# Patient Record
Sex: FEMALE | Race: WHITE | HISPANIC OR LATINO | Employment: UNEMPLOYED | URBAN - METROPOLITAN AREA
[De-identification: names, ages, dates, MRNs, and addresses within clinical notes are randomized per-mention and may not be internally consistent; named-entity substitution may affect disease eponyms.]

---

## 2022-05-27 PROCEDURE — 96372 THER/PROPH/DIAG INJ SC/IM: CPT

## 2022-05-27 PROCEDURE — 99285 EMERGENCY DEPT VISIT HI MDM: CPT | Mod: 25

## 2022-05-27 RX ORDER — DIPHENHYDRAMINE HCL 25 MG
50 CAPSULE ORAL ONCE
Status: DISCONTINUED | OUTPATIENT
Start: 2022-05-27 | End: 2022-05-28 | Stop reason: HOSPADM

## 2022-05-27 RX ORDER — ONDANSETRON 4 MG/1
4 TABLET, ORALLY DISINTEGRATING ORAL ONCE
Status: DISCONTINUED | OUTPATIENT
Start: 2022-05-27 | End: 2022-05-28 | Stop reason: HOSPADM

## 2022-05-28 ENCOUNTER — APPOINTMENT (OUTPATIENT)
Dept: GENERAL RADIOLOGY | Facility: CLINIC | Age: 72
End: 2022-05-28
Attending: EMERGENCY MEDICINE

## 2022-05-28 ENCOUNTER — HOSPITAL ENCOUNTER (EMERGENCY)
Facility: CLINIC | Age: 72
Discharge: HOME | End: 2022-05-28
Attending: EMERGENCY MEDICINE | Admitting: EMERGENCY MEDICINE

## 2022-05-28 VITALS
HEART RATE: 102 BPM | DIASTOLIC BLOOD PRESSURE: 74 MMHG | SYSTOLIC BLOOD PRESSURE: 124 MMHG | RESPIRATION RATE: 16 BRPM | OXYGEN SATURATION: 91 %

## 2022-05-28 DIAGNOSIS — T78.2XXA ANAPHYLAXIS, INITIAL ENCOUNTER: ICD-10-CM

## 2022-05-28 PROCEDURE — 250N000009 HC RX 250: Performed by: EMERGENCY MEDICINE

## 2022-05-28 PROCEDURE — 71046 X-RAY EXAM CHEST 2 VIEWS: CPT

## 2022-05-28 PROCEDURE — 250N000011 HC RX IP 250 OP 636: Performed by: EMERGENCY MEDICINE

## 2022-05-28 PROCEDURE — 96374 THER/PROPH/DIAG INJ IV PUSH: CPT

## 2022-05-28 PROCEDURE — 96375 TX/PRO/DX INJ NEW DRUG ADDON: CPT

## 2022-05-28 RX ORDER — PREDNISONE 20 MG/1
40 TABLET ORAL DAILY
Qty: 4 TABLET | Refills: 0 | Status: SHIPPED | OUTPATIENT
Start: 2022-05-28 | End: 2022-05-30

## 2022-05-28 RX ORDER — METHYLPREDNISOLONE SODIUM SUCCINATE 125 MG/2ML
125 INJECTION, POWDER, LYOPHILIZED, FOR SOLUTION INTRAMUSCULAR; INTRAVENOUS ONCE
Status: COMPLETED | OUTPATIENT
Start: 2022-05-28 | End: 2022-05-28

## 2022-05-28 RX ORDER — DIPHENHYDRAMINE HYDROCHLORIDE 50 MG/ML
25 INJECTION INTRAMUSCULAR; INTRAVENOUS ONCE
Status: COMPLETED | OUTPATIENT
Start: 2022-05-28 | End: 2022-05-28

## 2022-05-28 RX ORDER — ONDANSETRON 2 MG/ML
4 INJECTION INTRAMUSCULAR; INTRAVENOUS ONCE
Status: COMPLETED | OUTPATIENT
Start: 2022-05-28 | End: 2022-05-28

## 2022-05-28 RX ADMIN — METHYLPREDNISOLONE SODIUM SUCCINATE 125 MG: 125 INJECTION, POWDER, FOR SOLUTION INTRAMUSCULAR; INTRAVENOUS at 00:31

## 2022-05-28 RX ADMIN — DIPHENHYDRAMINE HYDROCHLORIDE 25 MG: 50 INJECTION, SOLUTION INTRAMUSCULAR; INTRAVENOUS at 00:32

## 2022-05-28 RX ADMIN — ONDANSETRON 4 MG: 2 INJECTION INTRAMUSCULAR; INTRAVENOUS at 01:40

## 2022-05-28 RX ADMIN — EPINEPHRINE 0.3 MG: 1 INJECTION INTRAMUSCULAR; INTRAVENOUS; SUBCUTANEOUS at 00:29

## 2022-05-28 NOTE — ED TRIAGE NOTES
Pt BIB family from home     Pt injects IM Bedoyectatri. Its an ODT multi vitamin. Pt has been using it for years. One other time she had similar reaction and coded.     Pt also co chest pain and nausea.     In triage resp are unlabored, breath sounds are clear       Triage Assessment     Row Name 05/27/22 1177       Triage Assessment (Adult)    Airway WDL WDL       Respiratory WDL    Respiratory WDL WDL       Skin Circulation/Temperature WDL    Skin Circulation/Temperature WDL X  redness on arms, trunk and back       Cardiac WDL    Cardiac WDL X  tachy       Peripheral/Neurovascular WDL    Peripheral Neurovascular WDL WDL       Cognitive/Neuro/Behavioral WDL    Cognitive/Neuro/Behavioral WDL WDL

## 2022-05-28 NOTE — ED PROVIDER NOTES
History   Chief Complaint:  Allergic Reaction       HPI   Bibi He is a 72 year old female with history of anaphylaxis who presents with allergy symptoms that started after injection of a Bedoyectatri that she uses weekly.  She once had a similar reaction to a different product a long time ago.  At that time she coded.   was offered and family is interpreting for her.  She has a diffuse rash and is itchy.  She does not have any shortness of breath or voice changes.  She has had a headache and developed vomiting and chest pain.  Has not taken any medications for it prior to coming.      Review of Systems  Ten system ROS reviewed and is negative except as above     Allergies:  Sulfa Drugs    Medications:  The patient is currently on no regular medications.    Past Medical History:     Anaphylaxis    Social History:  Social History     Socioeconomic History     Marital status: Single     Spouse name: Not on file     Number of children: Not on file     Years of education: Not on file     Highest education level: Not on file   Occupational History     Not on file   Tobacco Use     Smoking status: Not on file     Smokeless tobacco: Not on file   Substance and Sexual Activity     Alcohol use: Not on file     Drug use: Not on file     Sexual activity: Not on file   Other Topics Concern     Not on file   Social History Narrative     Not on file     Social Determinants of Health     Financial Resource Strain: Not on file   Food Insecurity: Not on file   Transportation Needs: Not on file   Physical Activity: Not on file   Stress: Not on file   Social Connections: Not on file   Intimate Partner Violence: Not on file   Housing Stability: Not on file       Physical Exam     Patient Vitals for the past 24 hrs:   BP Temp src Pulse Resp SpO2   05/28/22 0400 124/74 -- 102 -- 91 %   05/28/22 0250 -- -- 107 16 93 %   05/28/22 0245 124/62 -- 110 18 --   05/28/22 0215 134/65 -- 106 17 93 %   05/28/22 0200  121/62 -- 106 18 --   05/27/22 2345 105/50 Temporal 118 22 92 %       Physical Exam  Eyes:  Sclera white; Pupils are equal and round  ENT:    External ears and nares normal  CV:  Rate as above with regular rhythm   Resp:  Breath sounds clear and equal bilaterally without wheezing    Non-labored, no retractions or accessory muscle use  GI:  Abdomen is soft, non-tender, non-distended    No rebound tenderness or peritoneal features  MS:  Moves all extremities  Skin:  Warm and dry, diffuse erythema, hives, scratching  Neuro:  Speech is normal and fluent. No apparent deficit.        Emergency Department Course     Imaging:  XR Chest 2 Views   Final Result   IMPRESSION: Minimal linear atelectasis/fibrosis left lung base laterally. Slight hypertrophic changes bony skeleton. Minimally calcified thoracic aorta. The chest is otherwise negative with no acute cardiopulmonary findings.        Report per radiology    Laboratory:  Labs Ordered and Resulted from Time of ED Arrival to Time of ED Departure - No data to display     Procedures      Emergency Department Course:       Interventions:  Medications   diphenhydrAMINE (BENADRYL) capsule 50 mg (has no administration in time range)   ondansetron (ZOFRAN ODT) ODT tab 4 mg (has no administration in time range)   EPINEPHrine (ADRENALIN) kit 0.3 mg (0.3 mg Intramuscular Given 5/28/22 0029)   diphenhydrAMINE (BENADRYL) injection 25 mg (25 mg Intravenous Given 5/28/22 0032)   methylPREDNISolone sodium succinate (solu-MEDROL) injection 125 mg (125 mg Intravenous Given 5/28/22 0031)   ondansetron (ZOFRAN) injection 4 mg (4 mg Intravenous Given 5/28/22 0140)         Disposition:  The patient was discharged to home.     Impression & Plan     Medical Decision Making:  Bibi He is here for evaluation of allergic symptoms.  Symptoms are consistent with anaphylaxis.  Due to severity of symptoms ephinephrine, steroids, and antihistamines were given immediately.  Observed with  improvement and no signs of worsening.  However noted to have intermittent hypoxia to 88% without shortness of breath, then would increase to 92%.  CXR obtained without evidence of pulmonary edema, pleural effusion, pneumonia.  I discussed further evaluation of this with blood work, CT scan, and admission for supplemental oxygen.  They declined further work-up at this time and would prefer to be discharged and return if symptoms are worsening..  We will follow-up in clinic or return if allergic symptoms recur.  Avoid recurrent use of this injection product.  Pulse oximeter provided.  Return if persistently hypoxic.     Diagnosis:    ICD-10-CM    1. Anaphylaxis, initial encounter  T78.2XXA        Discharge Medications:  New Prescriptions    PREDNISONE (DELTASONE) 20 MG TABLET    Take 2 tablets (40 mg) by mouth daily for 2 days Take two tablets (= 40mg) each day for 5 (five) days       Scribe Disclosure:  Ame SEN MD, MD, am serving as a scribe at 12:20 AM on 5/28/2022 to document services personally performed by Ame Hyman MD based on my observations and the provider's statements to me.            Ame Hyman MD  05/28/22 0322